# Patient Record
Sex: MALE | Race: WHITE | NOT HISPANIC OR LATINO | URBAN - METROPOLITAN AREA
[De-identification: names, ages, dates, MRNs, and addresses within clinical notes are randomized per-mention and may not be internally consistent; named-entity substitution may affect disease eponyms.]

---
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
## 2023-05-05 ENCOUNTER — NEW PATIENT (OUTPATIENT)
Dept: URBAN - METROPOLITAN AREA CLINIC 36 | Facility: CLINIC | Age: 23
End: 2023-05-05

## 2023-05-05 DIAGNOSIS — H52.7: ICD-10-CM

## 2023-05-05 PROCEDURE — 92015 DETERMINE REFRACTIVE STATE: CPT

## 2023-05-05 PROCEDURE — 92004 COMPRE OPH EXAM NEW PT 1/>: CPT

## 2023-05-05 ASSESSMENT — TONOMETRY
OD_IOP_MMHG: 20
OS_IOP_MMHG: 20

## 2023-05-05 ASSESSMENT — VISUAL ACUITY
OD_SC: J1+
OS_SC: J1+
OD_SC: 20/60
OD_PH: 20/30
OS_PH: 20/30
OS_SC: 20/70

## 2024-05-08 ENCOUNTER — ESTABLISHED PATIENT (OUTPATIENT)
Dept: URBAN - METROPOLITAN AREA CLINIC 36 | Facility: CLINIC | Age: 24
End: 2024-05-08

## 2024-05-08 DIAGNOSIS — H52.7: ICD-10-CM

## 2024-05-08 PROCEDURE — 92310-4 LEVEL 4 CONTACT LENS MANAGEMENT

## 2024-05-08 PROCEDURE — 92015 DETERMINE REFRACTIVE STATE: CPT

## 2024-05-08 PROCEDURE — 92014 COMPRE OPH EXAM EST PT 1/>: CPT

## 2024-05-08 ASSESSMENT — VISUAL ACUITY
OS_SC: J1
OS_SC: 20/100
OD_SC: 20/50
OD_SC: J1
OS_CC: 20/20
OD_CC: J1
OD_CC: 20/20
OS_CC: J1

## 2024-05-08 ASSESSMENT — TONOMETRY
OD_IOP_MMHG: 20
OS_IOP_MMHG: 20

## 2024-05-14 ENCOUNTER — CONTACT LENSES/GLASSES VISIT (OUTPATIENT)
Dept: URBAN - METROPOLITAN AREA CLINIC 36 | Facility: CLINIC | Age: 24
End: 2024-05-14

## 2024-05-14 DIAGNOSIS — Z97.3: ICD-10-CM

## 2024-05-14 PROCEDURE — 92310F

## 2024-05-14 ASSESSMENT — VISUAL ACUITY
OS_CC: 20/25-2
OD_CC: 20/30
OD_CC: J1+
OS_CC: J1+
OD_SC: 20/70
OS_BAT: 20/20
OD_BAT: 20/20
OU_CC: 20/20
OS_SC: 20/200

## 2024-05-20 ENCOUNTER — CONTACT LENSES/GLASSES VISIT (OUTPATIENT)
Dept: URBAN - METROPOLITAN AREA CLINIC 36 | Facility: CLINIC | Age: 24
End: 2024-05-20

## 2024-05-20 DIAGNOSIS — Z97.3: ICD-10-CM

## 2024-05-20 PROCEDURE — 92310F

## 2024-05-20 ASSESSMENT — VISUAL ACUITY
OD_CC: J1
OD_CC: 20/50
OS_CC: 20/50
OS_CC: J1+
OU_CC: 20/30
OU_CC: J1+

## 2024-05-30 ENCOUNTER — CONTACT LENSES/GLASSES VISIT (OUTPATIENT)
Dept: URBAN - METROPOLITAN AREA CLINIC 36 | Facility: CLINIC | Age: 24
End: 2024-05-30

## 2024-05-30 DIAGNOSIS — Z97.3: ICD-10-CM

## 2024-05-30 PROCEDURE — 92310F

## 2024-05-30 ASSESSMENT — VISUAL ACUITY
OU_CC: 20/25
OS_CC: 20/30 CL
OD_CC: J1 CL
OS_CC: J1 CL
OD_CC: 20/30 CL

## 2024-06-11 ENCOUNTER — CONTACT LENSES/GLASSES VISIT (OUTPATIENT)
Dept: URBAN - METROPOLITAN AREA CLINIC 36 | Facility: CLINIC | Age: 24
End: 2024-06-11

## 2024-06-11 DIAGNOSIS — Z97.3: ICD-10-CM

## 2024-06-11 PROCEDURE — 92310F

## 2024-06-11 ASSESSMENT — VISUAL ACUITY
OD_CC: 20/25+1
OS_CC: 20/25-1
OD_CC: J1+
OU_CC: 20/25+2
OS_CC: J1+

## 2024-06-17 ENCOUNTER — CONTACT LENSES/GLASSES VISIT (OUTPATIENT)
Dept: URBAN - METROPOLITAN AREA CLINIC 36 | Facility: CLINIC | Age: 24
End: 2024-06-17

## 2024-06-17 DIAGNOSIS — Z97.3: ICD-10-CM

## 2024-06-17 PROCEDURE — 92310F

## 2024-06-17 ASSESSMENT — VISUAL ACUITY
OU_CC: 20/20
OD_CC: 20/25
OS_CC: 20/25

## 2025-05-12 ENCOUNTER — COMPREHENSIVE EXAM (OUTPATIENT)
Age: 25
End: 2025-05-12

## 2025-05-12 DIAGNOSIS — H52.7: ICD-10-CM

## 2025-05-12 PROCEDURE — 92014 COMPRE OPH EXAM EST PT 1/>: CPT

## 2025-05-12 PROCEDURE — 92015 DETERMINE REFRACTIVE STATE: CPT

## 2025-05-12 PROCEDURE — 92310-1 LEVEL 1 SOFT LENS UPDATE
